# Patient Record
Sex: FEMALE | Race: WHITE | NOT HISPANIC OR LATINO | URBAN - METROPOLITAN AREA
[De-identification: names, ages, dates, MRNs, and addresses within clinical notes are randomized per-mention and may not be internally consistent; named-entity substitution may affect disease eponyms.]

---

## 2024-11-16 ENCOUNTER — EMERGENCY (EMERGENCY)
Facility: HOSPITAL | Age: 44
LOS: 1 days | Discharge: HOME | End: 2024-11-16
Attending: EMERGENCY MEDICINE
Payer: MEDICAID

## 2024-11-16 VITALS
OXYGEN SATURATION: 100 % | HEIGHT: 65 IN | HEART RATE: 99 BPM | WEIGHT: 171.74 LBS | TEMPERATURE: 98 F | RESPIRATION RATE: 20 BRPM | DIASTOLIC BLOOD PRESSURE: 69 MMHG | SYSTOLIC BLOOD PRESSURE: 112 MMHG

## 2024-11-16 LAB
APPEARANCE UR: CLEAR — SIGNIFICANT CHANGE UP
BACTERIA # UR AUTO: ABNORMAL /HPF
BILIRUB UR-MCNC: NEGATIVE — SIGNIFICANT CHANGE UP
CAST: 1 /LPF — SIGNIFICANT CHANGE UP (ref 0–4)
COLOR SPEC: YELLOW — SIGNIFICANT CHANGE UP
DIFF PNL FLD: ABNORMAL
FLUAV AG NPH QL: SIGNIFICANT CHANGE UP
FLUBV AG NPH QL: SIGNIFICANT CHANGE UP
GLUCOSE UR QL: NEGATIVE MG/DL — SIGNIFICANT CHANGE UP
HCG UR QL: NEGATIVE — SIGNIFICANT CHANGE UP
KETONES UR-MCNC: NEGATIVE MG/DL — SIGNIFICANT CHANGE UP
LEUKOCYTE ESTERASE UR-ACNC: ABNORMAL
NITRITE UR-MCNC: NEGATIVE — SIGNIFICANT CHANGE UP
PH UR: 6 — SIGNIFICANT CHANGE UP (ref 5–8)
PROT UR-MCNC: 100 MG/DL
RBC CASTS # UR COMP ASSIST: 3 /HPF — SIGNIFICANT CHANGE UP (ref 0–4)
RSV RNA NPH QL NAA+NON-PROBE: SIGNIFICANT CHANGE UP
SARS-COV-2 RNA SPEC QL NAA+PROBE: SIGNIFICANT CHANGE UP
SP GR SPEC: 1.02 — SIGNIFICANT CHANGE UP (ref 1–1.03)
SQUAMOUS # UR AUTO: 30 /HPF — HIGH (ref 0–5)
UROBILINOGEN FLD QL: 0.2 MG/DL — SIGNIFICANT CHANGE UP (ref 0.2–1)
WBC UR QL: 48 /HPF — HIGH (ref 0–5)

## 2024-11-16 PROCEDURE — 99283 EMERGENCY DEPT VISIT LOW MDM: CPT

## 2024-11-16 PROCEDURE — 99284 EMERGENCY DEPT VISIT MOD MDM: CPT

## 2024-11-16 PROCEDURE — T1013: CPT

## 2024-11-16 PROCEDURE — 81025 URINE PREGNANCY TEST: CPT

## 2024-11-16 PROCEDURE — 87086 URINE CULTURE/COLONY COUNT: CPT

## 2024-11-16 PROCEDURE — 87186 SC STD MICRODIL/AGAR DIL: CPT

## 2024-11-16 PROCEDURE — 81001 URINALYSIS AUTO W/SCOPE: CPT

## 2024-11-16 PROCEDURE — 87637 SARSCOV2&INF A&B&RSV AMP PRB: CPT

## 2024-11-16 RX ORDER — IBUPROFEN 200 MG
600 TABLET ORAL ONCE
Refills: 0 | Status: COMPLETED | OUTPATIENT
Start: 2024-11-16 | End: 2024-11-16

## 2024-11-16 RX ORDER — CEPHALEXIN 125 MG/5ML
1 SUSPENSION, RECONSTITUTED, ORAL (ML) ORAL
Qty: 14 | Refills: 0
Start: 2024-11-16 | End: 2024-11-22

## 2024-11-16 RX ORDER — CEPHALEXIN 125 MG/5ML
500 SUSPENSION, RECONSTITUTED, ORAL (ML) ORAL ONCE
Refills: 0 | Status: COMPLETED | OUTPATIENT
Start: 2024-11-16 | End: 2024-11-16

## 2024-11-16 RX ADMIN — Medication 500 MILLIGRAM(S): at 12:39

## 2024-11-16 RX ADMIN — Medication 600 MILLIGRAM(S): at 12:27

## 2024-11-16 NOTE — ED PROVIDER NOTE - ATTENDING APP SHARED VISIT CONTRIBUTION OF CARE
44 year old female no PMHx c/o malaise. PE unremarkable. viral illness. supportive care. PMD follow up

## 2024-11-16 NOTE — ED PROVIDER NOTE - OBJECTIVE STATEMENT
45 yo female no reported past medical hx, reporting subjective fever, body aches, back pain, headache, no cough, nosickcontact or travel. taking tylenol last does @4am. no chest pain. reports mild sob. no vomiting or diarrhea, reports dysuria. no hematuria

## 2024-11-16 NOTE — ED PROVIDER NOTE - NSFOLLOWUPINSTRUCTIONS_ED_ALL_ED_FT
alterne tylenol y motrin cada 6 horas para el alivio sintomático   siga con el médico de cabecera dentro de los próximos 3 a 5 días   aire humidificado en casa   descansar   hidratarse ananth   Los síntomas nuevos o que empeoran regresan al servicio de urgencias de inmediato.       Infección respiratoria viral    Malou infección respiratoria viral es malou enfermedad que afecta partes del cuerpo que se utilizan para respirar, fidelina los pulmones, la nariz y la garganta. Es causada por un germen llamado virus. Los síntomas pueden incluir secreción nasal, tos, estornudos, fatiga, poornima corporales, dolor de garganta, fiebre o dolor de yasmin. Se pueden usar medicamentos de venta yue para controlar los síntomas, erick la infección generalmente desaparece por sí jill en 5 a 10 días.     BUSQUE ATENCIÓN MÉDICA INMEDIATA SI TIENE ALGUNO DE LOS SIGUIENTES SÍNTOMAS: dificultad para respirar, dolor en el pecho, fiebre john más de 10 días o aturdimiento/mareos.      alternate tylenol and motrin every 6 hours for symptomatic relief   follow with primary medical clinician within the next 3-5 days   humidified air at home   rest   hydrate well   new or worsening symptoms return to the ED immediately       Viral Respiratory Infection    A viral respiratory infection is an illness that affects parts of the body used for breathing, like the lungs, nose, and throat. It is caused by a germ called a virus. Symptoms can include runny nose, coughing, sneezing, fatigue, body aches, sore throat, fever, or headache. Over the counter medicine can be used to manage the symptoms but the infection typically goes away on its own in 5 to 10 days.     SEEK IMMEDIATE MEDICAL CARE IF YOU HAVE ANY OF THE FOLLOWING SYMPTOMS: shortness of breath, chest pain, fever over 10 days, or lightheadedness/dizziness. alterne tylenol y motrin cada 6 horas para el alivio sintomático   siga con el médico de cabecera dentro de los próximos 3 a 5 días   aire humidificado en casa   descansar   hidratarse ananth   Los síntomas nuevos o que empeoran regresan al servicio de urgencias de inmediato.       Infección respiratoria viral    Malou infección respiratoria viral es malou enfermedad que afecta partes del cuerpo que se utilizan para respirar, fidelina los pulmones, la nariz y la garganta. Es causada por un germen llamado virus. Los síntomas pueden incluir secreción nasal, tos, estornudos, fatiga, poornima corporales, dolor de garganta, fiebre o dolor de yasmin. Se pueden usar medicamentos de venta yue para controlar los síntomas, erick la infección generalmente desaparece por sí jill en 5 a 10 días.     BUSQUE ATENCIÓN MÉDICA INMEDIATA SI TIENE ALGUNO DE LOS SIGUIENTES SÍNTOMAS: dificultad para respirar, dolor en el pecho, fiebre john más de 10 días o aturdimiento/mareos.    Infección del tracto urinario    Malou infección del tracto urinario (ITU) es malou infección de cualquier parte del tracto urinario, que incluye los riñones, los uréteres, la vejiga y la uretra. Los factores de riesgo incluyen ignorar la necesidad de orinar, limpiarse de atrás hacia adelante si es rosario, ser un hombre no circuncidado y tener diabetes o un sistema inmunológico débil. Los síntomas incluyen micción frecuente, dolor o ardor al orinar, orina con mal olor, orina turbia, dolor en la parte inferior del abdomen, effie en la orina y fiebre. Si le recetaron un antibiótico, tómelo según las indicaciones de malik proveedor de atención médica. No deje de symone el antibiótico incluso si comienza a sentirse mejor.    BUSQUE ATENCIÓN MÉDICA INMEDIATA SI TIENE ALGUNO DE LOS SIGUIENTES SÍNTOMAS: dolor intenso de espalda o abdominal, fiebre, incapacidad para retener líquidos o medicamentos, mareos/aturdimiento o un cambio en el estado mental.      alternate tylenol and motrin every 6 hours for symptomatic relief   follow with primary medical clinician within the next 3-5 days   humidified air at home   rest   hydrate well   new or worsening symptoms return to the ED immediately       Viral Respiratory Infection    A viral respiratory infection is an illness that affects parts of the body used for breathing, like the lungs, nose, and throat. It is caused by a germ called a virus. Symptoms can include runny nose, coughing, sneezing, fatigue, body aches, sore throat, fever, or headache. Over the counter medicine can be used to manage the symptoms but the infection typically goes away on its own in 5 to 10 days.     SEEK IMMEDIATE MEDICAL CARE IF YOU HAVE ANY OF THE FOLLOWING SYMPTOMS: shortness of breath, chest pain, fever over 10 days, or lightheadedness/dizziness.    Urinary Tract Infection    A urinary tract infection (UTI) is an infection of any part of the urinary tract, which includes the kidneys, ureters, bladder, and urethra. Risk factors include ignoring your need to urinate, wiping back to front if female, being an uncircumcised male, and having diabetes or a weak immune system. Symptoms include frequent urination, pain or burning with urination, foul smelling urine, cloudy urine, pain in the lower abdomen, blood in the urine, and fever. If you were prescribed an antibiotic medicine, take it as told by your health care provider. Do not stop taking the antibiotic even if you start to feel better.    SEEK IMMEDIATE MEDICAL CARE IF YOU HAVE ANY OF THE FOLLOWING SYMPTOMS: severe back or abdominal pain, fever, inability to keep fluids or medicine down, dizziness/lightheadedness, or a change in mental status.

## 2024-11-16 NOTE — ED PROVIDER NOTE - CLINICAL SUMMARY MEDICAL DECISION MAKING FREE TEXT BOX
45 yo female no reported past medical, presenting with flu like symptoms since thursday, pt nontoxic appearing stable vitals afebrile. pt also reporting dysuria. flu/covid/rsv obtained, urine sample pending.

## 2024-11-16 NOTE — ED PROVIDER NOTE - CARE PLAN
Principal Discharge DX:	Flu-like symptoms   1 Principal Discharge DX:	Flu-like symptoms  Secondary Diagnosis:	Acute UTI

## 2024-11-16 NOTE — ED PROVIDER NOTE - PROGRESS NOTE DETAILS
advised on conservative management of symptoms, return precautions, and fu with pcp 3-5 days + UTI will treat

## 2024-11-16 NOTE — ED ADULT NURSE NOTE - OBJECTIVE STATEMENT
Pt presents to ED with c/o  subjective fever, body aches, back pain, headache, no cough, no sick contact or travel. taking tylenol last does @4am. no chest pain. reports mild sob. no vomiting or diarrhea, reports dysuria. no hematuria.

## 2024-11-16 NOTE — ED ADULT NURSE NOTE - BREATHING, MLM
Care Management Follow Up    Length of Stay (days): 2    Expected Discharge Date: 05/14/2022     Concerns to be Addressed:  GI status, bleeding, ETOH withdrawl       Patient plan of care discussed at interdisciplinary rounds: Yes    Anticipated Discharge Disposition:  home     Anticipated Discharge Services:  TBD  Anticipated Discharge DME:  none      Additional Information:  Patient admitted for Hematemesis/upper GI bleed possibly variceal, Acute blood loss anemia. GI following.     Social History per initial CM consult:  Pt was difficult to understand due to slurred and mumbled speech.  SWCM offered to come back and Pt said he could answer questions.  Pt stated that he has been to treatment 6 times and were court ordered.  Pt states that he does in person AA meetings on Wednesday. Pt stated that he would be interested in treatment options.  SWCM to re-assess when Pt is more alert and able to communicate.     Patient lives alone in his apartment.     Anticipating return home. SWCM to follow up when more alert.         Ania Walsh RN         Spontaneous, unlabored and symmetrical

## 2024-11-16 NOTE — ED ADULT TRIAGE NOTE - TEMPERATURE IN CELSIUS (DEGREES C)
Continue with routine wound care as discussed  Gradually increase activities as tolerated  Follow-up as needed; please call with questions or concerns
36.7

## 2024-11-16 NOTE — ED PROVIDER NOTE - PATIENT PORTAL LINK FT
You can access the FollowMyHealth Patient Portal offered by [facility] by registering at the following website: [email or web address]. By joining [facility] [employer]’s FollowMyHealth portal, you will also be able to view your health information using other applications (apps) compatible with our system.

## 2024-12-05 ENCOUNTER — APPOINTMENT (OUTPATIENT)
Dept: ULTRASOUND IMAGING | Facility: CLINIC | Age: 44
End: 2024-12-05
Payer: COMMERCIAL

## 2024-12-05 ENCOUNTER — OUTPATIENT (OUTPATIENT)
Dept: OUTPATIENT SERVICES | Facility: HOSPITAL | Age: 44
LOS: 1 days | End: 2024-12-05
Payer: COMMERCIAL

## 2024-12-05 DIAGNOSIS — R92.8 OTHER ABNORMAL AND INCONCLUSIVE FINDINGS ON DIAGNOSTIC IMAGING OF BREAST: ICD-10-CM

## 2024-12-05 PROCEDURE — 76642 ULTRASOUND BREAST LIMITED: CPT | Mod: 26,RT

## 2024-12-05 PROCEDURE — 76642 ULTRASOUND BREAST LIMITED: CPT
